# Patient Record
Sex: MALE | Race: WHITE
[De-identification: names, ages, dates, MRNs, and addresses within clinical notes are randomized per-mention and may not be internally consistent; named-entity substitution may affect disease eponyms.]

---

## 2018-06-14 LAB
BASOPHILS # BLD AUTO: 0 10*3/UL (ref 0–0.1)
BASOPHILS NFR BLD AUTO: 0.3 % (ref 0–1)
DEPRECATED NEUTROPHILS # BLD AUTO: 5.9 10*3/UL (ref 2.1–6.9)
EOSINOPHIL # BLD AUTO: 0.2 10*3/UL (ref 0–0.4)
EOSINOPHIL NFR BLD AUTO: 1.9 % (ref 0–6)
ERYTHROCYTE [DISTWIDTH] IN CORD BLOOD: 12.2 % (ref 11.7–14.4)
HCT VFR BLD AUTO: 39.2 % (ref 38.2–49.6)
HGB BLD-MCNC: 13.8 G/DL (ref 14–18)
LYMPHOCYTES # BLD: 2.2 10*3/UL (ref 1–3.2)
LYMPHOCYTES NFR BLD AUTO: 23.4 % (ref 18–39.1)
MCH RBC QN AUTO: 31.4 PG (ref 28–32)
MCHC RBC AUTO-ENTMCNC: 35.2 G/DL (ref 31–35)
MCV RBC AUTO: 89.1 FL (ref 81–99)
MONOCYTES # BLD AUTO: 0.9 10*3/UL (ref 0.2–0.8)
MONOCYTES NFR BLD AUTO: 9.9 % (ref 4.4–11.3)
NEUTS SEG NFR BLD AUTO: 64 % (ref 38.7–80)
PLATELET # BLD AUTO: 253 X10E3/UL (ref 140–360)
RBC # BLD AUTO: 4.4 X10E6/UL (ref 4.3–5.7)

## 2018-06-21 ENCOUNTER — HOSPITAL ENCOUNTER (OUTPATIENT)
Dept: HOSPITAL 88 - OR | Age: 73
Discharge: HOME | End: 2018-06-21
Attending: INTERNAL MEDICINE
Payer: MEDICARE

## 2018-06-21 DIAGNOSIS — K21.9: Primary | ICD-10-CM

## 2018-06-21 DIAGNOSIS — K57.30: ICD-10-CM

## 2018-06-21 DIAGNOSIS — K63.5: ICD-10-CM

## 2018-06-21 DIAGNOSIS — K26.9: ICD-10-CM

## 2018-06-21 DIAGNOSIS — Z01.812: ICD-10-CM

## 2018-06-21 DIAGNOSIS — Z01.810: ICD-10-CM

## 2018-06-21 DIAGNOSIS — K22.10: ICD-10-CM

## 2018-06-21 DIAGNOSIS — N40.0: ICD-10-CM

## 2018-06-21 DIAGNOSIS — K31.7: ICD-10-CM

## 2018-06-21 DIAGNOSIS — K64.8: ICD-10-CM

## 2018-06-21 DIAGNOSIS — K58.9: ICD-10-CM

## 2018-06-21 DIAGNOSIS — R19.7: ICD-10-CM

## 2018-06-21 DIAGNOSIS — I10: ICD-10-CM

## 2018-06-21 DIAGNOSIS — K29.70: ICD-10-CM

## 2018-06-21 DIAGNOSIS — E78.00: ICD-10-CM

## 2018-06-21 PROCEDURE — 88305 TISSUE EXAM BY PATHOLOGIST: CPT

## 2018-06-21 PROCEDURE — 88312 SPECIAL STAINS GROUP 1: CPT

## 2018-06-21 PROCEDURE — 85025 COMPLETE CBC W/AUTO DIFF WBC: CPT

## 2018-06-21 PROCEDURE — 45378 DIAGNOSTIC COLONOSCOPY: CPT

## 2018-06-21 PROCEDURE — 43239 EGD BIOPSY SINGLE/MULTIPLE: CPT

## 2018-06-21 PROCEDURE — 36415 COLL VENOUS BLD VENIPUNCTURE: CPT

## 2018-06-21 PROCEDURE — 45384 COLONOSCOPY W/LESION REMOVAL: CPT

## 2018-06-21 PROCEDURE — 93005 ELECTROCARDIOGRAM TRACING: CPT

## 2018-06-21 NOTE — OPERATIVE REPORT
DATE OF PROCEDURE:  June 21, 2018 



REFERRING PHYSICIAN:   Dr. Bruce García



PROCEDURES PERFORMED

1. Esophagogastroduodenoscopy with biopsies.

2. Colonoscopy with polypectomy.



INDICATIONS FOR ESOPHAGOGASTRODUODENOSCOPY:  Acid reflux.



INDICATIONS FOR COLONOSCOPY:  Colorectal cancer screening.  Lower abdominal 

pain.  



MEDICATION:  Patient was done under MAC.  Please see anesthesiologist's 

note.



PROCEDURE:  With patient in the left lateral decubitus position, flexible 

fiberoptic Olympus gastroscope was introduced into the esophagus under 

direct visualization without any difficulty.  There was some erosions noted 

in the distal esophagus.  The GE junction was nodular and friable and 

biopsies were obtained.  The scope was then advanced with ease into the 

stomach.  Mucosa overlying the antrum and the body revealed some patchy 

erythema and low-grade edema and biopsies were obtained sent to stain for 

H. pylori.  Some hyperplastic appearing polyps were noted in the body of 

the stomach and some were partially excised with cold biopsy forceps.  

Pylorus appeared to be of normal contour and shape, was intubated with ease 

and the scope was advanced all the way to the to the 2nd portion of the 

duodenum.  A minute ulcer was noted in the proximal 2nd portion without 

active bleeding or stigmata of recent hemorrhage.  The scope was then 

withdrawn back into the stomach and retroflexed and mucosa overlying the 

fundus and the cardia appeared to be within normal limits.  The scope was 

then straightened out.  The scope was subsequently withdrawn.  Patient 

tolerated procedure well.



IMPRESSION 

1. Erosive distal esophagitis.

2. Gastroesophageal junction nodular friable biopsied.

3. Gastritis biopsied.  Biopsies sent to stain for H. pylori.

4. Gastric polyps, body, some partially excised with the cold biopsy 

forceps.  

5. Duodenal ulcer.



PLAN:  Follow up histology.  Initiate Protonix 40 mg 1 p.o. q. a.m.  a.c.



Patient was then turned around and after adequate lubrication of the anal 

canal a flexible fiberoptic Olympus colonoscope was inserted into the 

rectum with ease and advanced all the way to the cecum.  Prep overall was 

suboptimal with retained stools in the colon.  The scope was then withdrawn 

slowly, and whatever was visualized the mucosa overlying the cecum, 

ascending colon, transverse colon appeared to be within normal limits.  

Diverticular disease was noted to involve the distal descending and the 

sigmoid colon.  One polyp was hot biopsied from the sigmoid colon.  The 

rectum appeared to be within normal limits.  The scope was then retroflexed 

into the distal rectum and small internal hemorrhoids were noted, none of 

which was actively bleeding.  The scope was then straightened out, it was 

subsequently withdrawn.  Patient tolerated procedure well.



IMPRESSION

1. Suboptimal prep.

2. Diverticulosis.

3. Sigmoid colon polyp hot biopsied.

4. Internal hemorrhoids, none actively bleeding.





PLAN:  Follow up histology.  Initiate BSL 3 one p.o. b.i.d. and Bentyl 10 

mg 1 p.o. t.i.d.  Patient might benefit from a followup colonoscopy in 5 

years.  

  



DD:  06/21/2018 14:41

DT:  06/21/2018 14:54

Job#:  [id number]



cc:BRUCE GARCÍA MD

## 2018-06-28 ENCOUNTER — HOSPITAL ENCOUNTER (OUTPATIENT)
Dept: HOSPITAL 88 - US | Age: 73
End: 2018-06-28
Attending: UROLOGY
Payer: MEDICARE

## 2018-06-28 DIAGNOSIS — N28.1: Primary | ICD-10-CM

## 2018-06-28 PROCEDURE — 76770 US EXAM ABDO BACK WALL COMP: CPT

## 2018-06-28 NOTE — DIAGNOSTIC IMAGING REPORT
PROCEDURE:US RETROPERITONEAL ( KIDNEY ).

COMPARISON:Patients [facility], US, US RETROPERITONEAL ( KIDNEY 

)., 5/05/2017, 9:18.

INDICATIONS:Cyst Of Kidney

TECHNIQUE: Grey-scale and color sonographic images of the bilateral 

kidneys and bladder where obtained in transverse and longitudinal 

planes.

 

FINDINGS:

 

RIGHT KIDNEY: 12.2 cm, cortex 1.8 cm

Cysts: 2.1 x 2.3 x 1.5 cm cystic, anechoic lesion in the lateral mid to 

inferior aspect (previously measured 2.1 x 2.3 x 1.4 cm). 

Solid masses: None

Stones: None

Hydronephrosis: None

Echogenicity: Normal

 

LEFT KIDNEY: 12.1 cm, cortex 2.0 cm

Cysts: None

Solid masses: None

Stones: None

Hydronephrosis: None

Echogenicity: Normal

 

Bladder: Unremarkable. Bilateral ureteral jets are identified.

 

Prostate: 2.3 x 2.0 x 3.1 cm (estimated volume 7.5 mL).

 

CONCLUSION:

1. Stable simple right renal cyst.

2. Normal bilateral renal size, and echogenicity. No hydronephrosis or 

obstruction.

 

 

 

 

Mehul Cox M.D.  

Dictated by:  Mehul Cox M.D. on 6/28/2018 at 16:21     

Electronically approved by:  Mehul Cox M.D. on 6/28/2018 at 

16:21

## 2018-10-03 NOTE — XMS REPORT
Patient Summary Document

 Created on: 10/03/2018



RADHA RICKETTS

External Reference #: [id number]

: 1945

Sex: Male



Demographics







 Address  [address] [place] [address] 

[place]  [address]

 

 Home Phone  [phone] CE[place]

 

 Preferred Language  Unknown

 

 Marital Status  Unknown

 

 Anabaptist Affiliation  Unknown

 

 Race  Unknown

 

 Additional Race(s)  

 

 Ethnic Group  Unknown





Author







 Author  [facility]

 

 Address  Unknown

 

 Phone  Unavailable







Care Team Providers







 Care Team Member Name  Role  Phone

 

 KARLA ROSS  Unavailable

 

 KAREN MEDEIROS  Unavailable  Unavailable

 

 RADHA MCHUGH  Unavailable  Unavailable







Problems

This patient has no known problems.



Allergies, Adverse Reactions, Alerts

This patient has no known allergies or adverse reactions.



Medications

This patient has no known medications.



Results







 Test Description  Test Time  Test Comments  Text Results  Atomic Results  
Result Comments









 US RENAL RETROPERITONEAL COMP  2018 16:21:00         [place] [address]      
Patient Name: RADHA RICKETTS   MR #: [id number]    : 1945 Age/Sex: 72/M  
Acct #: [id number] Req #: [id number]  Adm Physician:     Ordered by: KARLA ROSS MD  Report #: [id number]   Location:   Room/Bed:     ______________________
_____________________________________________________________________________  
  Procedure: [id number] US/US RENAL RETROPERITONEAL COMP  Exam Date: 18   
                         Exam Time: 1504       REPORT STATUS: Signed    
PROCEDURE:   US RETROPERITONEAL ( KIDNEY ).   COMPARISON:   [facility], , US RETROPERITONEAL ( KIDNEY    )., 2017, 9:18.   INDICATIONS: 
  Cyst Of Kidney   TECHNIQUE: Grey-scale and color sonographic images of the 
bilateral    kidneys and bladder where obtained in transverse and longitudinal 
   planes.       FINDINGS:          RIGHT KIDNEY: 12.2 cm, cortex 1.8 cm   Cysts
: 2.1 x 2.3 x 1.5 cm cystic, anechoic lesion in the lateral mid to    inferior 
aspect (previously measured 2.1 x 2.3 x 1.4 cm).    Solid masses: None   Stones
: None   Hydronephrosis: None   Echogenicity: Normal       LEFT KIDNEY: 12.1 cm
, cortex 2.0 cm   Cysts: None   Solid masses: None   Stones: None   
Hydronephrosis: None   Echogenicity: Normal       Bladder: Unremarkable. 
Bilateral ureteral jets are identified.       Prostate: 2.3 x 2.0 x 3.1 cm (
estimated volume 7.5 mL).       CONCLUSION:      1. Stable simple right renal 
cyst.   2. Normal bilateral renal size, and echogenicity. No hydronephrosis or 
   obstruction.                   Geraldine Cox M.D.     Dictated by:  
Geraldine Cox M.D. on 2018 at 16:21        Electronically approved by
:  Geraldine Cox M.D. on 2018 at    16:21                Dictated By: 
GERALDINE COX MD  Electronically Signed By: GERALDINE COX MD on 18 162
  Transcribed By: YAMILA on 18 1621       COPY TO:   KARLA ROSS MD        
   

 

 SPINE CERVICAL AP LAT FLEX EXT            [place] [address]      Patient Name: 
RADHA RICKETTS   MR #: [id number]    : 1945 Age/Sex: 72/M  Acct #: 
[id number] Req #: [id number]  Adm Physician:     Ordered by: KAREN MEDEIROS MD  Report #: [id number]   Location: [facility]  Room/Bed:     _________________________
__________________________________________________________________________    
Procedure: [id number] DX/SPINE CERVICAL AP LAT FLEX EXT  Exam Date: 17    
                        Exam Time: 1030       REPORT STATUS: Signed    PROCEDURE
:   C-SPINE AP AND LAT WITH FLEX AND EXT       COMPARISON:   Cervical spine 
series 2017.       INDICATIONS:   POST OP 6 MONTHS CERVICAL FUSION       
FINDINGS:      The lateral view is visualized from the skull base to C7. 
Postoperative    changes of anterior fusion of C5-C7. Minimal interval bony 
fusion.    Degenerative changes evidenced by anterior osteophytosis. The 
vertebral    bodies are well-aligned. There are no fractures, lytic or blastic 
   lesions. The disc-space heights are well-maintained. The C1/C2-odontoid    
interval is normal. The pre-vertebral soft tissues are normal.        CONCLUSION
:      Postoperative changes.       Dictated by:  George Montes M.D. on 2017 at 12:02        Electronically approved by:  George Montes M.D. on 2017 at 12:02                    Dictated By: GEORGE MONTES MD  Electronically 
Signed By: GEORGE MONTES MD on 17 1202  Transcribed By: YAMILA on 17 
1202       COPY TO:   KAREN MEDEIROS MD           

 

 MRI SPINE LUMBAR WO            [place] [address]      Patient Name: RADHA RICKETTS
   MR #: [id number]    : 1945 Age/Sex: 72/M  Acct #: [id number] Req #
: [id number]  Adm Physician:     Ordered by: RADHA MCHUGH MD  Report #: [id number]
[id number]   Location: MRI  Room/Bed:     ____________________________________________
_______________________________________________________    Procedure: [id number] 
MRI/MRI SPINE LUMBAR WO  Exam Date: 10/11/17                            Exam 
Time: 08       REPORT STATUS: Signed    History: Bilateral leg pain, left 
greater than right   Comparison studies: Lumbar spine MRI on 2016      
Technique:    Sagittal, coronal and axial T2 , sagittal T1 and IR, axial  spin 
density   oblique.   Intravenous contrast: None      Findings:      Number of 
lumbar vertebral bodies:5      Alignment:    2 mm retrolisthesis of L5 on S1.   
Normal lordosis.No scoliosis.      Soft tissues: No T2 hyperintense 
inflammatory changes.   Paraspinal muscles: No signal abnormalities.  No 
atrophy.   Lower thoracic cord:Normal in signal and morphology.  The tip of the 
conus is   at  T12-L1.   Cauda equina: No masses. No arachnoiditis.      
Vertebrae:   Normal in height and signal intensity.   No compression fractures, 
infection or neoplasm.      Degenerative changes:      L1-L2:   No 
abnormalities.      L2-L3:   No abnormalities.      L3-L4:   Mild spinal canal 
and is moderate left foraminal stenosis due to an asymmetric   disc bulge.   No 
significant right foraminal stenosis. No disc herniation.      L4-L5:   Mildly 
degenerated disc (mostly decreased T2 signal).   Severe spinal canal stenosis 
due to a disc bulge, thickened ligamenta flava and   facet arthrosis.   No 
significant foraminal stenosis. No disc herniation.      L5-S1:   Mildly 
degenerated disc (mostly decreased T2 signal within posterior annular   
fissure.   Foraminal stenosis, moderate left, mild right due to an asymmetric 
disc bulge.   No significant spinal canal stenosis. No disc radiation         
Partially visualized sacrum:   No signal abnormalities.      Incidental 2.7 cm 
T2 hyperintense focus in the left kidney most likely an   incidental renal 
cyst.      IMPRESSION:      No changes when compared to the lumbar spine MRI on 
2016.      Persistent findings:   1.  Mildly degenerated disks at L4-5 and 
L5-S1.   2.  Severe spinal canal stenosis at L4-5, mild at L3-4.   3.  
Degenerative foraminal stenosis, moderate left at L3-4 and L5-S1.   4.  No disc 
herniations      Signed by: Dr. Rod Jacobs M.D. on 10/11/2017 5:38 PM 
       Dictated By: ROD JACOBS MD, MD  Electronically Signed By: ROD JACOBS MD, MD on 10/11/17 173  Transcribed By: JAGDEEP on 10/11/17 1738
       COPY TO:   RADHA MCHUGH MD

## 2018-11-30 ENCOUNTER — HOSPITAL ENCOUNTER (INPATIENT)
Dept: HOSPITAL 88 - ER | Age: 73
LOS: 3 days | Discharge: HOME | DRG: 418 | End: 2018-12-03
Attending: INTERNAL MEDICINE | Admitting: INTERNAL MEDICINE
Payer: MEDICARE

## 2018-11-30 VITALS — DIASTOLIC BLOOD PRESSURE: 72 MMHG | SYSTOLIC BLOOD PRESSURE: 129 MMHG

## 2018-11-30 VITALS — HEIGHT: 69 IN | WEIGHT: 195 LBS | BODY MASS INDEX: 28.88 KG/M2

## 2018-11-30 DIAGNOSIS — E78.5: ICD-10-CM

## 2018-11-30 DIAGNOSIS — N17.9: ICD-10-CM

## 2018-11-30 DIAGNOSIS — K80.00: Primary | ICD-10-CM

## 2018-11-30 DIAGNOSIS — K21.9: ICD-10-CM

## 2018-11-30 DIAGNOSIS — K82.1: ICD-10-CM

## 2018-11-30 DIAGNOSIS — K82.A1: ICD-10-CM

## 2018-11-30 DIAGNOSIS — E11.9: ICD-10-CM

## 2018-11-30 DIAGNOSIS — Z79.84: ICD-10-CM

## 2018-11-30 LAB
ALBUMIN SERPL-MCNC: 3.2 G/DL (ref 3.5–5)
ALBUMIN/GLOB SERPL: 0.7 {RATIO} (ref 0.8–2)
ALP SERPL-CCNC: 53 IU/L (ref 40–150)
ALT SERPL-CCNC: 26 IU/L (ref 0–55)
AMYLASE SERPL-CCNC: 39 U/L (ref 25–125)
ANION GAP SERPL CALC-SCNC: 15.5 MMOL/L (ref 8–16)
BACTERIA URNS QL MICRO: (no result) /HPF
BASOPHILS # BLD AUTO: 0.1 10*3/UL (ref 0–0.1)
BASOPHILS NFR BLD AUTO: 0.3 % (ref 0–1)
BILIRUB UR QL: NEGATIVE
BUN SERPL-MCNC: 33 MG/DL (ref 7–26)
BUN/CREAT SERPL: 17 (ref 6–25)
CALCIUM SERPL-MCNC: 10.2 MG/DL (ref 8.4–10.2)
CHLORIDE SERPL-SCNC: 95 MMOL/L (ref 98–107)
CK MB SERPL-MCNC: 0.7 NG/ML (ref 0–5)
CK SERPL-CCNC: 48 IU/L (ref 30–200)
CLARITY UR: CLEAR
CO2 SERPL-SCNC: 25 MMOL/L (ref 22–29)
COLOR UR: YELLOW
DEPRECATED APTT PLAS QN: 34.3 SECONDS (ref 23.8–35.5)
DEPRECATED INR PLAS: 1.12
DEPRECATED NEUTROPHILS # BLD AUTO: 11.7 10*3/UL (ref 2.1–6.9)
DEPRECATED RBC URNS MANUAL-ACNC: (no result) /HPF (ref 0–5)
EGFRCR SERPLBLD CKD-EPI 2021: 33 ML/MIN (ref 60–?)
EOSINOPHIL # BLD AUTO: 0.1 10*3/UL (ref 0–0.4)
EOSINOPHIL NFR BLD AUTO: 0.5 % (ref 0–6)
EPI CELLS URNS QL MICRO: (no result) /LPF
ERYTHROCYTE [DISTWIDTH] IN CORD BLOOD: 12 % (ref 11.7–14.4)
GLOBULIN PLAS-MCNC: 4.7 G/DL (ref 2.3–3.5)
GLUCOSE SERPLBLD-MCNC: 183 MG/DL (ref 74–118)
HCT VFR BLD AUTO: 42.5 % (ref 38.2–49.6)
HGB BLD-MCNC: 14.6 G/DL (ref 14–18)
HYALINE CASTS #/AREA URNS LPF: (no result) /[LPF] (ref 0–1)
KETONES UR QL STRIP.AUTO: NEGATIVE
LEUKOCYTE ESTERASE UR QL STRIP.AUTO: NEGATIVE
LIPASE SERPL-CCNC: 20 U/L (ref 8–78)
LYMPHOCYTES # BLD: 2.4 10*3/UL (ref 1–3.2)
LYMPHOCYTES NFR BLD AUTO: 15.2 % (ref 18–39.1)
MCH RBC QN AUTO: 30 PG (ref 28–32)
MCHC RBC AUTO-ENTMCNC: 34.4 G/DL (ref 31–35)
MCV RBC AUTO: 87.3 FL (ref 81–99)
MONOCYTES # BLD AUTO: 1.3 10*3/UL (ref 0.2–0.8)
MONOCYTES NFR BLD AUTO: 8.2 % (ref 4.4–11.3)
NEUTS SEG NFR BLD AUTO: 75.3 % (ref 38.7–80)
NITRITE UR QL STRIP.AUTO: NEGATIVE
PLATELET # BLD AUTO: 322 X10E3/UL (ref 140–360)
POTASSIUM SERPL-SCNC: 3.5 MMOL/L (ref 3.5–5.1)
PROT UR QL STRIP.AUTO: NEGATIVE
PROTHROMBIN TIME: 15.4 SECONDS (ref 11.9–14.5)
RBC # BLD AUTO: 4.87 X10E6/UL (ref 4.3–5.7)
SODIUM SERPL-SCNC: 132 MMOL/L (ref 136–145)
SP GR UR STRIP: 1 (ref 1.01–1.02)
UROBILINOGEN UR STRIP-MCNC: 0.2 MG/DL (ref 0.2–1)
WBC #/AREA URNS HPF: (no result) /HPF (ref 0–5)

## 2018-11-30 PROCEDURE — 76705 ECHO EXAM OF ABDOMEN: CPT

## 2018-11-30 PROCEDURE — 74177 CT ABD & PELVIS W/CONTRAST: CPT

## 2018-11-30 PROCEDURE — 99284 EMERGENCY DEPT VISIT MOD MDM: CPT

## 2018-11-30 PROCEDURE — 83690 ASSAY OF LIPASE: CPT

## 2018-11-30 PROCEDURE — 85610 PROTHROMBIN TIME: CPT

## 2018-11-30 PROCEDURE — 81001 URINALYSIS AUTO W/SCOPE: CPT

## 2018-11-30 PROCEDURE — 88304 TISSUE EXAM BY PATHOLOGIST: CPT

## 2018-11-30 PROCEDURE — 71045 X-RAY EXAM CHEST 1 VIEW: CPT

## 2018-11-30 PROCEDURE — 82550 ASSAY OF CK (CPK): CPT

## 2018-11-30 PROCEDURE — 93005 ELECTROCARDIOGRAM TRACING: CPT

## 2018-11-30 PROCEDURE — 36415 COLL VENOUS BLD VENIPUNCTURE: CPT

## 2018-11-30 PROCEDURE — 80053 COMPREHEN METABOLIC PANEL: CPT

## 2018-11-30 PROCEDURE — 85025 COMPLETE CBC W/AUTO DIFF WBC: CPT

## 2018-11-30 PROCEDURE — 85730 THROMBOPLASTIN TIME PARTIAL: CPT

## 2018-11-30 PROCEDURE — 84484 ASSAY OF TROPONIN QUANT: CPT

## 2018-11-30 PROCEDURE — 82150 ASSAY OF AMYLASE: CPT

## 2018-11-30 PROCEDURE — 82948 REAGENT STRIP/BLOOD GLUCOSE: CPT

## 2018-11-30 PROCEDURE — 82553 CREATINE MB FRACTION: CPT

## 2018-11-30 RX ADMIN — SODIUM CHLORIDE SCH GM: 9 INJECTION, SOLUTION INTRAVENOUS at 15:20

## 2018-11-30 RX ADMIN — INSULIN LISPRO SCH UNIT: 100 INJECTION, SOLUTION INTRAVENOUS; SUBCUTANEOUS at 18:36

## 2018-11-30 RX ADMIN — SODIUM CHLORIDE SCH MLS/HR: 9 INJECTION, SOLUTION INTRAVENOUS at 15:20

## 2018-11-30 RX ADMIN — INSULIN LISPRO SCH UNIT: 100 INJECTION, SOLUTION INTRAVENOUS; SUBCUTANEOUS at 21:00

## 2018-11-30 NOTE — XMS REPORT
Continuity of Care Document

                             Created on: 2018



BRUCE JAIMES

External Reference #: [id number]

: 1945

Sex: Male



Demographics







                          Address                   [address] [place]  [address]

 

                          Home Phone                [phone]

 

                          Preferred Language        English

 

                          Marital Status            

 

                          Hoahaoism Affiliation     Nondenominational (non-Orthodoxy, non-specific)

 

                          Race                      White

 

                          Ethnic Group              Non-





Author







                          Author                    [facility]

 

                          Organization              [facility]

 

                          Address                   Unknown

 

                          Phone                     Unavailable







Support







                Name            Relationship    Address         Phone

 

                    TONYA VELASQUEZ MD    Caregiver           [address]

[address]

[place], [place]  [id number] [phone]







Insurance Providers







                          Guarantor                 Bruce Jaimes

 

                          Address                   [address] [place] [address]

 

                          Phone                     [phone]

 

                          Email                     N











                          Payer                     [employer]

 

                          Policy Number             [id number]

 

                          Subscriber's Name         Bruce Jaimes

 

                          Relationship              Self / Same As Patient

 

                          Group Number              [id number]

 

                          Group Name                [facility]

 

                          Effective Date            17











                          Payer                     Medicare

 

                          Policy Number             [id number]

 

                          Subscriber's Name         Bruce Jaimes

 

                          Relationship              Self / Same As Patient

 

                          Group Number              [id number]

 

                          Group Name                MEDICARE

 

                          Effective Date            07/01/10







Advance Directives







                    Directive           Response            Recorded Date/Time

 

                    Does the Patient have an Advance Directive?    No                  18 6:39pm







Chief Complaint and Reason for Visit







                          Chief Complaint           Chest Pain

 

                          Reason for Visit          Hyperglycemia

Atypical chest pain







Problems





Active ProblemsNo active problem information available.







Past Problems





                    Medical Problem     Onset Date          Status

 

                    Hyperglycemia       Unknown             Acute

 

                    Atypical chest pain    Unknown             Acute







Medications





Current Home Medications





        Medication    Dose    Units    Route    Directions    Days    Qty     Instructions    Start Date

 

          Metformin Hcl (Glucophage) 500 Mg Tab    500       Mg        Oral      Twice A Day    15 Days    30 Tablet

                                                    18







Social History







             Social History Problem    Response     Recorded Date/Time    Onset Date    Status

 

             Hx Tobacco Use    No           2018 8:03pm    Not Applicable    Not Applicable







Hospital Discharge Instructions

No hospital discharge instruction information available.



Plan of Care







                          Discharge Date            18 11:55pm

 

                          Disposition               HOME, SELF-CARE 01

 

                          Condition at Discharge    Stable

 

                          Instructions/Education Provided    Chest Pain That Is Not Caused by the Heart (DC)



Hyperglycemia, Adult (DC)

 

                          Prescriptions             See Medication Section







Functional Status

No functional status information available.



Allergies, Adverse Reactions, Alerts

No known allergies.



Immunizations







                    Query               Response on File    Recorded Date/Time

 

                    HX of Pneumococcal Vaccine    Yes                 18 6:17pm

 

                    HX of Influenza Vaccine    Yes                 18 6:17pm







Vital Signs





Acute Vital Signs





                    Vital               Response            Date/Time

 

                    Temperature (Fahrenheit)    98.1 degrees F (97.6 - 99.5)    2018 11:54pm

 

                    Pulse Rate (adult)    87 bpm (60 - 100)    2018 11:53pm

 

                    Pulse Rate          87 bpm              2018 11:54pm

 

                    Respiratory Rate    20 breaths per minute (12 - 24)    2018 11:53pm

 

                    Respiratory Rate    20 breaths per minute    2018 11:54pm

 

                    Blood Pressure Systolic    173 mm Hg (100 - 140)    2018 11:53pm

 

                    Blood Pressure Systolic    173 mm Hg           2018 11:54pm

 

                    Blood Pressure Diastolic    76 mm Hg (60 - 90)    2018 11:53pm

 

                    Blood Pressure Diastolic    76 mm Hg            2018 11:54pm

 

                    Height              5 ft 10 in          2018 6:15pm

 

                    Weight              199 lb              2018 6:15pm

 

                    Body Mass Index     28.6 kg/m^2         2018 6:15pm







Results





Laboratory Results





          Test Name    Result    Units     Flags     Reference    Collection Date/Time    Result Date/Time

                                        Comments

 

          White Blood Count    8.0       10*3/uL              4.5-11.5    2018 7:35pm    2018 7:48pm

                                         

 

          Red Blood Count    4.87      10*6/uL              4.4-6.2    2018 7:35pm    2018 7:48pm

                                         

 

        Hemoglobin    14.6    g/dL            13.0-17.5    2018 7:35pm    2018 7:48pm     

 

        Hematocrit    43.7    %               39.0-52.5    2018 7:35pm    2018 7:48pm     

 

          Mean Corpuscular Volume    90        fL                  80-94     2018 7:35pm    2018 7:48pm 

                                         

 

          Mean Corpuscular Hemoglobin    30.0      pg                  27.0-33.0    2018 7:35pm    2018

 7:48pm                                  

 

           Mean Corpuscular Hemoglobin Concent    33.4       g/dL                  33.0-37.0    2018 7:35pm

                          2018 7:48pm          

 

          Red Cell Distribution Width    12.1      %                   10.7-14.5    2018 7:35pm    2018

 7:48pm                                  

 

        Platelet Count    263     10*3/uL            150-450    2018 7:35pm    2018 7:48pm    

 

 

          Mean Platelet Volume    9.9       fl                  5.7-10.7    2018 7:35pm    2018 7:48pm

                                         

 

        Neutrophils (%) (Auto)    69      %               47-75    2018 7:35pm    2018 7:48pm     



 

          Immature Granulocyte % (Auto)    0         %                   0-0       2018 7:35pm    2018 7:48pm

                                         

 

        Lymphocytes (%) (Auto)    21      %        L      25-44    2018 7:35pm    2018 7:48pm    

 

 

        Monocytes (%) (Auto)    9       %               3-10    2018 7:35pm    2018 7:48pm     

 

        Eosinophils (%) (Auto)    2       %               0-7     2018 7:35pm    2018 7:48pm     

 

        Basophils (%) (Auto)    1       %               0-1     2018 7:35pm    2018 7:48pm     

 

          Nucleated Red Blood Cells %    0.0       %                   0-0.2     2018 7:35pm    2018 7:48pm

                                         

 

          Neutrophils # (Auto)    5.5       10*3/uL              1.3-6.7    2018 7:35pm    2018 7:48pm

                                         

 

          Immature Granulocyte # (Auto)    0.0       10*3/uL              0.0-0.0    2018 7:35pm    2018

 7:48pm                                  

 

          Lymphocytes # (Auto)    1.7       10*3/uL              1.4-4.1    2018 7:35pm    2018 7:48pm

                                         

 

          Monocytes # (Auto)    0.7       10*3/uL              0-1.3     2018 7:35pm    2018 7:48pm

                                         

 

          Eosinophils # (Auto)    0.1       10*3/uL              0-0.8     2018 7:35pm    2018 7:48pm

                                         

 

          Basophils # (Auto)    0.0       10*3/uL              0-0.1     2018 7:35pm    2018 7:48pm

                                         

 

          Nucleated Red Blood Cells #    0.00      10*3/uL              0-0.01    2018 7:35pm    2018

 7:48pm                                  

 

        Manual Differential    Not Ind                            2018 7:35pm    2018 7:48pm     

 

        Sodium Level    135     mmol/L     L      136-145    2018 7:35pm    2018 8:07pm     

 

        Potassium Level    4.2     mmol/L            3.5-5.1    2018 7:35pm    2018 8:07pm    

 

 

        Chloride Level    97      mmol/L     L          2018 7:35pm    2018 8:07pm     

 

          Carbon Dioxide Level    24        mmol/L              24-33     2018 7:35pm    2018 8:07pm

                                         

 

        Anion Gap    18                      8-18    2018 7:35pm    2018 8:07pm     

 

        Blood Urea Nitrogen    21      mg/dL            82018 7:35pm    2018 8:07pm     



 

        Creatinine    1.3     mg/dL            0.9-1.5    2018 7:35pm    2018 8:07pm     

 

          Estimat Glomerular Filtration Rate    58                                2018 7:35pm    2018

 8:07pm 

      Stages of Patients with           Estimated GFR

       Known Kidney Disease         (ml/min/1.73 sq.meters)



Stage 1 - Kidney damage w/normal      90 mL/min or greater

           or increased GFR

Stage 2 - Kidney disease w/mildly     60-89 mL/min

           decreased GFR

Stage 3 - Moderately decreased GFR    30-59 mL/min

Stage 4 - Severely decreased GFR      15-29 mL/min

Stage 5 - Kidney failure              14 mL/min or less



To estimate the GFR for  Americans, multiply the 

result provided by 1.21.

 

        Glucose Level    382     mg/dL     H          2018 7:35pm    2018 8:07pm     

 

        Calcium Level    9.6     mg/dL            9.1-10.9    2018 7:35pm    2018 8:07pm     

 

        Total Bilirubin    0.4     mg/dL            0.0-1.0    2018 7:35pm    2018 8:07pm     



 

          Aspartate Amino Transf (AST/SGOT)    21        U/L                 0-40      2018 7:35pm    2018

 8:07pm                                  

 

          Alanine Aminotransferase (ALT/SGPT)    28        U/L                 0-41      2018 7:35pm    2018

 8:07pm                                  

 

        Total Protein    7.6     g/dL            6.4-8.3    2018 7:35pm    2018 8:07pm     

 

        Albumin    4.4     g/dL            3.5-5.0    2018 7:35pm    2018 8:07pm     

 

        Alkaline Phosphatase    54      U/L                 2018 7:35pm    2018 8:07pm    

 

 

        Lipase    62      U/L      H      13-60    2018 7:35pm    2018 8:07pm     

 

          Helicobacter pylori Antibodies    Negative                        Negative    2018 7:35pm    2018

 8:58pm 

H.pylori serological testing cannot distinguish between 

active and past infections. Alternative noninvasive testing 

recommended for adults includes the stool antigen test. 

Order Helicobacter pylori Antigen - Fecal.

 

          Bedside Troponin I    0.00      ng/mL               0.00-0.07    2018 9:13pm    2018 9:24pm

 



*NOTE: ED bedside Cardiac Marker results may not correlate 

directly with results from Clinical Lab due to differences 

in methodologies.

 

          Bedside Glucose    309       mg/dL      H            2018 11:22pm    2018 11:24pm 

                                         







Procedures







                Procedure       Status          Date            Provider(s)

 

                ECG (electrocardiogram)    Active          18        KADEN MALIN MD

 

                X-ray of chest, single view    Completed       18        KADEN MALIN MD







Encounters







             Encounter    Location     Arrival/Admit Date    Discharge/Depart Date    Attending Provider



 

                Departed Emergency Room    RODERICK Teague    18 6:06pm    18 11:55pm

                                        TONYA VELASQUEZ MD











                                        Recent Diagnosis

 

                                        Hyperglycemia

## 2018-11-30 NOTE — DIAGNOSTIC IMAGING REPORT
EXAMINATION:  CHEST SINGLE (NOT PORTABLE)    



INDICATION:      Abdomen pain.  





COMPARISON:  None

     

FINDINGS:

TUBES and LINES:  None.



LUNGS:  Lungs are well inflated.  Likely minimal scarring/atelectasis at the

left lung base.   There is no evidence of pneumonia or pulmonary edema.



PLEURA:  No pleural effusion or pneumothorax.



HEART AND MEDIASTINUM:  The cardiomediastinal silhouette is unremarkable.    



BONES AND SOFT TISSUES:  No acute osseous lesion.  Soft tissues are

unremarkable. Surgical hardware is seen over the lower cervical spine.



UPPER ABDOMEN: No free air under the diaphragm.    



IMPRESSION: 

Likely minimal scarring/atelectasis at the left lung base.  





Signed by: Dr. Augie Bacon M.D. on 11/30/2018 12:22 PM

## 2018-11-30 NOTE — DIAGNOSTIC IMAGING REPORT
EXAMINATION: CT of the abdomen and pelvis with contrast.



TECHNIQUE: 

Spiral CT images of the abdomen and pelvis were performed from the lung bases

to the lesser trochanters after the intravenous administration of 100 cc

Isovue-370 and the oral administration of water. Coronal and sagittal

reformatted images were obtained.



COMPARISON: Right upper quadrant ultrasound same day



CLINICAL HISTORY:Right-sided abdominal pain

     

DISCUSSION:



ABDOMEN/PELVIS:



LOWER THORAX:Scattered linear opacities in the lung bases compatible with

subsegmental atelectasis. 5 mm groundglass nodule left lower lobe series 2

image 8.



HEPATOBILIARY: Hepatic parenchyma is diffusely hypoattenuating compatible with

steatosis. No focal hepatic lesion.  No intrahepatic biliary ductal dilatation.

A 9 mm radiopaque calculus is lodged in the gallbladder neck as seen on series

2 image 30. There is resultant gallbladder wall thickening and enhancement,

with pericholecystic inflammatory fat stranding. No radiopaque distal biliary

calculi are appreciated.      



SPLEEN: No splenomegaly.



PANCREAS: No focal masses or ductal dilatation. 



ADRENALS: No adrenal nodules.



KIDNEYS/URETERS: 2.4 cm simple cyst projects medially from the right kidney. No

hydronephrosis or calculi.



PELVIC ORGANS/BLADDER: Bilateral bladder diverticula. Urinary bladder is

otherwise unremarkable. Surgical clips along the prostate, which measures 4.3

cm in diameter, with coarse central calcifications.



PERITONEUM/RETROPERITONEUM: No ascites. No pneumoperitoneum.



LYMPH NODES: No pelvic sidewall, retroperitoneal, or mesenteric

lymphadenopathy. Enhancing probably reactive subcentimeter lymph node in the

eva hepatis.



VESSELS: Abdominal aorta, major branch vessels, and iliac arterial systems are

well-visualized and patent with minimal atherosclerotic calcification.

Circumaortic left renal vein. Portal vein, splenic vein, and central superior

mesenteric vein are patent.



GI TRACT: Scattered diverticula along the sigmoid colon without wall thickening

or adjacent inflammatory change. Normal appendix. Stomach is partially

collapsed with prominent rugal folds. No small bowel dilatation to suggest

obstruction.



BONES AND SOFT TISSUE: No osseous destructive lesions. Multilevel degenerative

disc changes and facet arthropathy of the lumbar spine. L4 laminectomy defect. 

No focal soft tissue abnormalities.



IMPRESSION: 



Radiopaque calculus in the gallbladder neck, not visualized on comparison

ultrasound, results in acute cholecystitis.



Hepatic steatosis with regional sparing along the gallbladder fossa.



Atherosclerotic vascular disease.



5 mm groundglass nodule in the left lower lobe is indeterminate and should be

assessed for stability by CT scan of the chest without contrast in one year.



Signed by: Dr. Justice Zee M.D. on 11/30/2018 1:56 PM

## 2018-11-30 NOTE — DIAGNOSTIC IMAGING REPORT
EXAM: Right Upper Quadrant Ultrasound

INDICATION:        

^ABD PAIN

^[id number]

^1058

^Y

COMPARISON: None. 

TECHNIQUE: Transverse and longitudinal images of the right upper abdomen were

obtained. 



FINDINGS:     

Liver:

Size: 11.6 cm in the right midclavicular line, normal

Appearance: Normal echogenicity, smooth contour

Mass: No focal masses



Gallbladder:

Stones/Sludge: None

Wall: 0.4 cm

Appearance: No wall thickening, pericholecystic fluid or hydrops.  

Sonographic Capps's Sign: Negative



Bile Ducts:

Intrahepatic Ducts: No dilatation

Extrahepatic Ducts: Common bile duct measures 0.9 cm, prominent



Pancreas:

Visualized portions of the pancreatic head, neck and proximal body are normal.



Kidneys:

Length: 

Right 11.7 cm             

Echogenicity:  Normal

Collecting System:  No hydronephrosis

Stone:  None

Cyst/Mass: 1.5 x 1.9 x 1.6 cm nonvascular anechoic simple cyst in the right

kidney with mild perinephric fluid.



Vessels:

Aorta: Visualized portions are normal

Inferior Vena Cava: Visualized portions are normal

Main Portal Vein: 1.1 cm, normal size with hepatopetal flow.



Free Fluid:

No ascites or pleural effusion



IMPRESSION:

Simple right renal cyst, measuring up to 1.9 cm.



No gallstones or pericholecystic fluid.



Nonspecific mildly prominence of the common bile duct, measuring up to 0.9 cm,

without visualized stones.



Signed by: Dr. Merary Barry M.D. on 11/30/2018 12:15 PM

## 2018-12-01 VITALS — SYSTOLIC BLOOD PRESSURE: 136 MMHG | DIASTOLIC BLOOD PRESSURE: 67 MMHG

## 2018-12-01 VITALS — SYSTOLIC BLOOD PRESSURE: 143 MMHG | DIASTOLIC BLOOD PRESSURE: 76 MMHG

## 2018-12-01 VITALS — DIASTOLIC BLOOD PRESSURE: 54 MMHG | SYSTOLIC BLOOD PRESSURE: 110 MMHG

## 2018-12-01 VITALS — DIASTOLIC BLOOD PRESSURE: 76 MMHG | SYSTOLIC BLOOD PRESSURE: 143 MMHG

## 2018-12-01 VITALS — SYSTOLIC BLOOD PRESSURE: 129 MMHG | DIASTOLIC BLOOD PRESSURE: 65 MMHG

## 2018-12-01 VITALS — DIASTOLIC BLOOD PRESSURE: 67 MMHG | SYSTOLIC BLOOD PRESSURE: 136 MMHG

## 2018-12-01 LAB
ALBUMIN SERPL-MCNC: 2.6 G/DL (ref 3.5–5)
ALBUMIN/GLOB SERPL: 0.6 {RATIO} (ref 0.8–2)
ALP SERPL-CCNC: 82 IU/L (ref 40–150)
ALT SERPL-CCNC: 67 IU/L (ref 0–55)
AMYLASE SERPL-CCNC: 37 U/L (ref 25–125)
ANION GAP SERPL CALC-SCNC: 13.6 MMOL/L (ref 8–16)
BASOPHILS # BLD AUTO: 0 10*3/UL (ref 0–0.1)
BASOPHILS NFR BLD AUTO: 0.4 % (ref 0–1)
BUN SERPL-MCNC: 23 MG/DL (ref 7–26)
BUN/CREAT SERPL: 18 (ref 6–25)
CALCIUM SERPL-MCNC: 9.3 MG/DL (ref 8.4–10.2)
CHLORIDE SERPL-SCNC: 103 MMOL/L (ref 98–107)
CO2 SERPL-SCNC: 25 MMOL/L (ref 22–29)
DEPRECATED NEUTROPHILS # BLD AUTO: 5 10*3/UL (ref 2.1–6.9)
EGFRCR SERPLBLD CKD-EPI 2021: 54 ML/MIN (ref 60–?)
EOSINOPHIL # BLD AUTO: 0.2 10*3/UL (ref 0–0.4)
EOSINOPHIL NFR BLD AUTO: 2.2 % (ref 0–6)
ERYTHROCYTE [DISTWIDTH] IN CORD BLOOD: 12.1 % (ref 11.7–14.4)
GLOBULIN PLAS-MCNC: 4.1 G/DL (ref 2.3–3.5)
GLUCOSE SERPLBLD-MCNC: 137 MG/DL (ref 74–118)
HCT VFR BLD AUTO: 37.4 % (ref 38.2–49.6)
HGB BLD-MCNC: 12.8 G/DL (ref 14–18)
LIPASE SERPL-CCNC: 17 U/L (ref 8–78)
LYMPHOCYTES # BLD: 1.2 10*3/UL (ref 1–3.2)
LYMPHOCYTES NFR BLD AUTO: 16.9 % (ref 18–39.1)
MCH RBC QN AUTO: 30.5 PG (ref 28–32)
MCHC RBC AUTO-ENTMCNC: 34.2 G/DL (ref 31–35)
MCV RBC AUTO: 89.3 FL (ref 81–99)
MONOCYTES # BLD AUTO: 0.7 10*3/UL (ref 0.2–0.8)
MONOCYTES NFR BLD AUTO: 9.9 % (ref 4.4–11.3)
NEUTS SEG NFR BLD AUTO: 70 % (ref 38.7–80)
PLATELET # BLD AUTO: 278 X10E3/UL (ref 140–360)
POTASSIUM SERPL-SCNC: 4.6 MMOL/L (ref 3.5–5.1)
RBC # BLD AUTO: 4.19 X10E6/UL (ref 4.3–5.7)
SODIUM SERPL-SCNC: 137 MMOL/L (ref 136–145)

## 2018-12-01 PROCEDURE — 0FT44ZZ RESECTION OF GALLBLADDER, PERCUTANEOUS ENDOSCOPIC APPROACH: ICD-10-PCS | Performed by: SURGERY

## 2018-12-01 RX ADMIN — INSULIN LISPRO SCH UNIT: 100 INJECTION, SOLUTION INTRAVENOUS; SUBCUTANEOUS at 07:30

## 2018-12-01 RX ADMIN — SODIUM CHLORIDE SCH MLS/HR: 9 INJECTION, SOLUTION INTRAVENOUS at 02:20

## 2018-12-01 RX ADMIN — SODIUM CHLORIDE SCH MLS/HR: 9 INJECTION, SOLUTION INTRAVENOUS at 08:37

## 2018-12-01 RX ADMIN — INSULIN LISPRO SCH UNIT: 100 INJECTION, SOLUTION INTRAVENOUS; SUBCUTANEOUS at 11:30

## 2018-12-01 RX ADMIN — INSULIN LISPRO SCH UNIT: 100 INJECTION, SOLUTION INTRAVENOUS; SUBCUTANEOUS at 21:28

## 2018-12-01 RX ADMIN — SODIUM CHLORIDE SCH MLS/HR: 9 INJECTION, SOLUTION INTRAVENOUS at 23:50

## 2018-12-01 RX ADMIN — SODIUM CHLORIDE SCH MG: 900 INJECTION INTRAVENOUS at 17:39

## 2018-12-01 RX ADMIN — SODIUM CHLORIDE SCH MLS/HR: 9 INJECTION, SOLUTION INTRAVENOUS at 14:22

## 2018-12-01 RX ADMIN — ACETAMINOPHEN PRN MG: 10 INJECTION, SOLUTION INTRAVENOUS at 23:50

## 2018-12-01 RX ADMIN — INSULIN LISPRO SCH UNIT: 100 INJECTION, SOLUTION INTRAVENOUS; SUBCUTANEOUS at 16:30

## 2018-12-01 RX ADMIN — TAZOBACTAM SODIUM AND PIPERACILLIN SODIUM SCH MLS/HR: 375; 3 INJECTION, SOLUTION INTRAVENOUS at 18:26

## 2018-12-01 RX ADMIN — SODIUM CHLORIDE SCH GM: 9 INJECTION, SOLUTION INTRAVENOUS at 13:30

## 2018-12-01 RX ADMIN — METRONIDAZOLE SCH MLS/HR: 500 INJECTION, SOLUTION INTRAVENOUS at 17:40

## 2018-12-02 VITALS — DIASTOLIC BLOOD PRESSURE: 90 MMHG | SYSTOLIC BLOOD PRESSURE: 187 MMHG

## 2018-12-02 VITALS — DIASTOLIC BLOOD PRESSURE: 71 MMHG | SYSTOLIC BLOOD PRESSURE: 127 MMHG

## 2018-12-02 VITALS — SYSTOLIC BLOOD PRESSURE: 180 MMHG | DIASTOLIC BLOOD PRESSURE: 92 MMHG

## 2018-12-02 VITALS — DIASTOLIC BLOOD PRESSURE: 79 MMHG | SYSTOLIC BLOOD PRESSURE: 162 MMHG

## 2018-12-02 VITALS — SYSTOLIC BLOOD PRESSURE: 187 MMHG | DIASTOLIC BLOOD PRESSURE: 90 MMHG

## 2018-12-02 VITALS — SYSTOLIC BLOOD PRESSURE: 152 MMHG | DIASTOLIC BLOOD PRESSURE: 76 MMHG

## 2018-12-02 LAB
ALBUMIN SERPL-MCNC: 2.5 G/DL (ref 3.5–5)
ALBUMIN SERPL-MCNC: 2.5 G/DL (ref 3.5–5)
ALBUMIN/GLOB SERPL: 0.6 {RATIO} (ref 0.8–2)
ALBUMIN/GLOB SERPL: 0.6 {RATIO} (ref 0.8–2)
ALP SERPL-CCNC: 104 IU/L (ref 40–150)
ALP SERPL-CCNC: 92 IU/L (ref 40–150)
ALT SERPL-CCNC: 102 IU/L (ref 0–55)
ALT SERPL-CCNC: 82 IU/L (ref 0–55)
AMYLASE SERPL-CCNC: 26 U/L (ref 25–125)
ANION GAP SERPL CALC-SCNC: 13.1 MMOL/L (ref 8–16)
ANION GAP SERPL CALC-SCNC: 14.7 MMOL/L (ref 8–16)
BASOPHILS # BLD AUTO: 0 10*3/UL (ref 0–0.1)
BASOPHILS # BLD AUTO: 0 10*3/UL (ref 0–0.1)
BASOPHILS NFR BLD AUTO: 0.2 % (ref 0–1)
BASOPHILS NFR BLD AUTO: 0.4 % (ref 0–1)
BUN SERPL-MCNC: 13 MG/DL (ref 7–26)
BUN SERPL-MCNC: 18 MG/DL (ref 7–26)
BUN/CREAT SERPL: 12 (ref 6–25)
BUN/CREAT SERPL: 14 (ref 6–25)
CALCIUM SERPL-MCNC: 9 MG/DL (ref 8.4–10.2)
CALCIUM SERPL-MCNC: 9 MG/DL (ref 8.4–10.2)
CHLORIDE SERPL-SCNC: 105 MMOL/L (ref 98–107)
CHLORIDE SERPL-SCNC: 106 MMOL/L (ref 98–107)
CO2 SERPL-SCNC: 23 MMOL/L (ref 22–29)
CO2 SERPL-SCNC: 24 MMOL/L (ref 22–29)
DEPRECATED NEUTROPHILS # BLD AUTO: 5.7 10*3/UL (ref 2.1–6.9)
DEPRECATED NEUTROPHILS # BLD AUTO: 7 10*3/UL (ref 2.1–6.9)
EGFRCR SERPLBLD CKD-EPI 2021: 55 ML/MIN (ref 60–?)
EGFRCR SERPLBLD CKD-EPI 2021: > 60 ML/MIN (ref 60–?)
EOSINOPHIL # BLD AUTO: 0 10*3/UL (ref 0–0.4)
EOSINOPHIL # BLD AUTO: 0.1 10*3/UL (ref 0–0.4)
EOSINOPHIL NFR BLD AUTO: 0 % (ref 0–6)
EOSINOPHIL NFR BLD AUTO: 0.7 % (ref 0–6)
ERYTHROCYTE [DISTWIDTH] IN CORD BLOOD: 12.3 % (ref 11.7–14.4)
ERYTHROCYTE [DISTWIDTH] IN CORD BLOOD: 12.4 % (ref 11.7–14.4)
GLOBULIN PLAS-MCNC: 4.1 G/DL (ref 2.3–3.5)
GLOBULIN PLAS-MCNC: 4.3 G/DL (ref 2.3–3.5)
GLUCOSE SERPLBLD-MCNC: 178 MG/DL (ref 74–118)
GLUCOSE SERPLBLD-MCNC: 206 MG/DL (ref 74–118)
HCT VFR BLD AUTO: 37.7 % (ref 38.2–49.6)
HCT VFR BLD AUTO: 38.7 % (ref 38.2–49.6)
HGB BLD-MCNC: 12.7 G/DL (ref 14–18)
HGB BLD-MCNC: 12.8 G/DL (ref 14–18)
LIPASE SERPL-CCNC: 6 U/L (ref 8–78)
LYMPHOCYTES # BLD: 0.9 10*3/UL (ref 1–3.2)
LYMPHOCYTES # BLD: 1.3 10*3/UL (ref 1–3.2)
LYMPHOCYTES NFR BLD AUTO: 10.2 % (ref 18–39.1)
LYMPHOCYTES NFR BLD AUTO: 16 % (ref 18–39.1)
MCH RBC QN AUTO: 29.8 PG (ref 28–32)
MCH RBC QN AUTO: 30.2 PG (ref 28–32)
MCHC RBC AUTO-ENTMCNC: 33.1 G/DL (ref 31–35)
MCHC RBC AUTO-ENTMCNC: 33.7 G/DL (ref 31–35)
MCV RBC AUTO: 89.8 FL (ref 81–99)
MCV RBC AUTO: 90 FL (ref 81–99)
MONOCYTES # BLD AUTO: 0.9 10*3/UL (ref 0.2–0.8)
MONOCYTES # BLD AUTO: 0.9 10*3/UL (ref 0.2–0.8)
MONOCYTES NFR BLD AUTO: 11.6 % (ref 4.4–11.3)
MONOCYTES NFR BLD AUTO: 9.8 % (ref 4.4–11.3)
NEUTS SEG NFR BLD AUTO: 70.6 % (ref 38.7–80)
NEUTS SEG NFR BLD AUTO: 79.1 % (ref 38.7–80)
PLATELET # BLD AUTO: 297 X10E3/UL (ref 140–360)
PLATELET # BLD AUTO: 324 X10E3/UL (ref 140–360)
POTASSIUM SERPL-SCNC: 4.1 MMOL/L (ref 3.5–5.1)
POTASSIUM SERPL-SCNC: 4.7 MMOL/L (ref 3.5–5.1)
RBC # BLD AUTO: 4.2 X10E6/UL (ref 4.3–5.7)
RBC # BLD AUTO: 4.3 X10E6/UL (ref 4.3–5.7)
SODIUM SERPL-SCNC: 138 MMOL/L (ref 136–145)
SODIUM SERPL-SCNC: 139 MMOL/L (ref 136–145)

## 2018-12-02 RX ADMIN — SODIUM CHLORIDE SCH MLS/HR: 9 INJECTION, SOLUTION INTRAVENOUS at 12:38

## 2018-12-02 RX ADMIN — INSULIN LISPRO SCH UNIT: 100 INJECTION, SOLUTION INTRAVENOUS; SUBCUTANEOUS at 21:00

## 2018-12-02 RX ADMIN — METRONIDAZOLE SCH MLS/HR: 500 INJECTION, SOLUTION INTRAVENOUS at 18:29

## 2018-12-02 RX ADMIN — TAZOBACTAM SODIUM AND PIPERACILLIN SODIUM SCH MLS/HR: 375; 3 INJECTION, SOLUTION INTRAVENOUS at 06:40

## 2018-12-02 RX ADMIN — METRONIDAZOLE SCH MLS/HR: 500 INJECTION, SOLUTION INTRAVENOUS at 12:38

## 2018-12-02 RX ADMIN — TAZOBACTAM SODIUM AND PIPERACILLIN SODIUM SCH MLS/HR: 375; 3 INJECTION, SOLUTION INTRAVENOUS at 00:55

## 2018-12-02 RX ADMIN — SODIUM CHLORIDE SCH MG: 900 INJECTION INTRAVENOUS at 16:47

## 2018-12-02 RX ADMIN — METRONIDAZOLE SCH MLS/HR: 500 INJECTION, SOLUTION INTRAVENOUS at 00:05

## 2018-12-02 RX ADMIN — AMLODIPINE BESYLATE SCH MG: 10 TABLET ORAL at 08:32

## 2018-12-02 RX ADMIN — INSULIN LISPRO SCH UNIT: 100 INJECTION, SOLUTION INTRAVENOUS; SUBCUTANEOUS at 16:30

## 2018-12-02 RX ADMIN — ACETAMINOPHEN PRN MG: 10 INJECTION, SOLUTION INTRAVENOUS at 12:36

## 2018-12-02 RX ADMIN — INSULIN LISPRO SCH UNIT: 100 INJECTION, SOLUTION INTRAVENOUS; SUBCUTANEOUS at 11:30

## 2018-12-02 RX ADMIN — BENAZEPRIL HYDROCHLORIDE SCH MG: 10 TABLET, COATED ORAL at 08:32

## 2018-12-02 RX ADMIN — METRONIDAZOLE SCH MLS/HR: 500 INJECTION, SOLUTION INTRAVENOUS at 05:32

## 2018-12-02 RX ADMIN — TAZOBACTAM SODIUM AND PIPERACILLIN SODIUM SCH MLS/HR: 375; 3 INJECTION, SOLUTION INTRAVENOUS at 17:07

## 2018-12-02 RX ADMIN — INSULIN LISPRO SCH UNIT: 100 INJECTION, SOLUTION INTRAVENOUS; SUBCUTANEOUS at 07:30

## 2018-12-02 RX ADMIN — TAZOBACTAM SODIUM AND PIPERACILLIN SODIUM SCH MLS/HR: 375; 3 INJECTION, SOLUTION INTRAVENOUS at 12:38

## 2018-12-02 NOTE — OPERATIVE REPORT
DATE OF PROCEDURE:  December 01, 2018 



PREOPERATIVE DIAGNOSES:  Acute cholecystitis and cholelithiasis with 

gallbladder hydrops.



POSTOPERATIVE DIAGNOSES:  Acute gangrenous cholecystitis and cholelithiasis 

with gallbladder hydrops.



OPERATION PERFORMED:  Laparoscopic cholecystectomy.



ANESTHESIA:  General.



COMPLICATIONS:  None.



ESTIMATED BLOOD LOSS:  50 mL.



DESCRIPTION OF PROCEDURE:  With the patient lying in bed in the supine 

position under good general endotracheal anesthesia, the abdomen was 

prepped Betadine solution and draped in the usual manner.  A Veress needle 

was introduced into the umbilicus and pneumoperitoneum was established 

without any difficulty.  An 11-mm trocar was placed into the umbilicus and 

a 10-mm video laparoscope was placed into the intraabdominal cavity.  Under 

direct vision, three 5-mm trocars were placed in the right subcostal region 

and an extra 5-mm trocar was placed in the left upper abdomen.  Video 

laparoscopy at this point revealed the gallbladder that was totally covered 

up by the omentum.  There was some purulent material overlying the liver.  

Once we managed to mobilize the omentum off of the gallbladder, the 

gallbladder was acutely inflamed with gangrenous changes.  It was acutely 

distended with the stone impacted at the neck of the gallbladder.  The rest 

of the abdominal exploration appeared to be within normal limits other than 

some fatty infiltration of the liver.  All of the adhesions to the 

gallbladder were then slowly and carefully taken down.  Once this was done, 

the peritoneum overlying the neck of the gallbladder was then opened.  

There was a lot of inflammatory reaction and edema in the area.  

Nonetheless, the cystic duct was identified and followed to its junction 

with the common duct. The cystic duct was then circumferentially dissected 

away from the common duct, doubly clipped and divided.  The cystic artery 

had an anterior and a posterior branch and both of these were individually 

clipped and divided.  Gallbladder was then slowly and carefully taken off 

the liver bed.  There was a lot of inflammatory reaction on the posterior 

wall of the gallbladder and the liver bed with some gangrenous changes of 

the gallbladder wall.  Nonetheless, the gallbladder was then slowly and 

carefully taken off of the liver bed and perfect hemostasis was 

ascertained.  The gallbladder was then placed in a pouch and removed 

through the umbilicus after enlarging the umbilical incision.  Video 

laparoscopy was then again carried out.  The liver bed was found to be 

perfectly dry.  The area was thoroughly irrigated and all of the excess 

fluid was aspirated and hemostasis was ascertained.  Because of all 

inflammatory changes, a 10 flat Cristino-Ball was left in the hepatorenal 

fossa and brought out through the lateral most port on the right side and 

sutured to the skin with 2-0 silk.  The pneumoperitoneum was then evacuated 

and all the trocars were removed under direct vision.  The midline fascia 

at the umbilicus was then closed with figure-of-eight of 0 Vicryl.  All 

layers were infiltrated on the way out with solution of 0.25% Marcaine.  

Subcutaneous tissue was approximated with 3-0 Vicryl and the skin was 

closed with subcuticular 5-0 Vicryl.  Benzoin, Steri-Strips, and Band-Aids 

were applied.  The sponge, lap and needle count was correct.  The patient 

tolerated the procedure well and returned to the recovery room in stable 

condition.  

  







DD:  12/01/2018 16:15

DT:  12/01/2018 23:07

Job#:  [id number] VAS

## 2018-12-03 VITALS — DIASTOLIC BLOOD PRESSURE: 72 MMHG | SYSTOLIC BLOOD PRESSURE: 153 MMHG

## 2018-12-03 VITALS — SYSTOLIC BLOOD PRESSURE: 159 MMHG | DIASTOLIC BLOOD PRESSURE: 84 MMHG

## 2018-12-03 VITALS — DIASTOLIC BLOOD PRESSURE: 94 MMHG | SYSTOLIC BLOOD PRESSURE: 185 MMHG

## 2018-12-03 VITALS — DIASTOLIC BLOOD PRESSURE: 86 MMHG | SYSTOLIC BLOOD PRESSURE: 166 MMHG

## 2018-12-03 VITALS — SYSTOLIC BLOOD PRESSURE: 149 MMHG | DIASTOLIC BLOOD PRESSURE: 83 MMHG

## 2018-12-03 VITALS — DIASTOLIC BLOOD PRESSURE: 84 MMHG | SYSTOLIC BLOOD PRESSURE: 159 MMHG

## 2018-12-03 LAB
ALBUMIN SERPL-MCNC: 2.4 G/DL (ref 3.5–5)
ALBUMIN/GLOB SERPL: 0.6 {RATIO} (ref 0.8–2)
ALP SERPL-CCNC: 82 IU/L (ref 40–150)
ALT SERPL-CCNC: 65 IU/L (ref 0–55)
ANION GAP SERPL CALC-SCNC: 13.6 MMOL/L (ref 8–16)
BASOPHILS # BLD AUTO: 0 10*3/UL (ref 0–0.1)
BASOPHILS NFR BLD AUTO: 0.4 % (ref 0–1)
BUN SERPL-MCNC: 11 MG/DL (ref 7–26)
BUN/CREAT SERPL: 11 (ref 6–25)
CALCIUM SERPL-MCNC: 8.7 MG/DL (ref 8.4–10.2)
CHLORIDE SERPL-SCNC: 107 MMOL/L (ref 98–107)
CO2 SERPL-SCNC: 20 MMOL/L (ref 22–29)
DEPRECATED NEUTROPHILS # BLD AUTO: 5.4 10*3/UL (ref 2.1–6.9)
EGFRCR SERPLBLD CKD-EPI 2021: > 60 ML/MIN (ref 60–?)
EOSINOPHIL # BLD AUTO: 0.1 10*3/UL (ref 0–0.4)
EOSINOPHIL NFR BLD AUTO: 1.5 % (ref 0–6)
ERYTHROCYTE [DISTWIDTH] IN CORD BLOOD: 12.3 % (ref 11.7–14.4)
GLOBULIN PLAS-MCNC: 4 G/DL (ref 2.3–3.5)
GLUCOSE SERPLBLD-MCNC: 154 MG/DL (ref 74–118)
HCT VFR BLD AUTO: 36.4 % (ref 38.2–49.6)
HGB BLD-MCNC: 11.8 G/DL (ref 14–18)
LYMPHOCYTES # BLD: 1.5 10*3/UL (ref 1–3.2)
LYMPHOCYTES NFR BLD AUTO: 18.7 % (ref 18–39.1)
MCH RBC QN AUTO: 29.6 PG (ref 28–32)
MCHC RBC AUTO-ENTMCNC: 32.4 G/DL (ref 31–35)
MCV RBC AUTO: 91.5 FL (ref 81–99)
MONOCYTES # BLD AUTO: 0.8 10*3/UL (ref 0.2–0.8)
MONOCYTES NFR BLD AUTO: 10.2 % (ref 4.4–11.3)
NEUTS SEG NFR BLD AUTO: 68.6 % (ref 38.7–80)
PLATELET # BLD AUTO: 296 X10E3/UL (ref 140–360)
POTASSIUM SERPL-SCNC: 3.6 MMOL/L (ref 3.5–5.1)
RBC # BLD AUTO: 3.98 X10E6/UL (ref 4.3–5.7)
SODIUM SERPL-SCNC: 137 MMOL/L (ref 136–145)

## 2018-12-03 RX ADMIN — INSULIN LISPRO SCH UNIT: 100 INJECTION, SOLUTION INTRAVENOUS; SUBCUTANEOUS at 11:30

## 2018-12-03 RX ADMIN — TAZOBACTAM SODIUM AND PIPERACILLIN SODIUM SCH MLS/HR: 375; 3 INJECTION, SOLUTION INTRAVENOUS at 06:46

## 2018-12-03 RX ADMIN — SODIUM CHLORIDE SCH MG: 900 INJECTION INTRAVENOUS at 17:50

## 2018-12-03 RX ADMIN — INSULIN LISPRO SCH UNIT: 100 INJECTION, SOLUTION INTRAVENOUS; SUBCUTANEOUS at 07:30

## 2018-12-03 RX ADMIN — TAZOBACTAM SODIUM AND PIPERACILLIN SODIUM SCH MLS/HR: 375; 3 INJECTION, SOLUTION INTRAVENOUS at 17:50

## 2018-12-03 RX ADMIN — METRONIDAZOLE SCH MLS/HR: 500 INJECTION, SOLUTION INTRAVENOUS at 13:00

## 2018-12-03 RX ADMIN — METRONIDAZOLE SCH MLS/HR: 500 INJECTION, SOLUTION INTRAVENOUS at 18:00

## 2018-12-03 RX ADMIN — BENAZEPRIL HYDROCHLORIDE SCH MG: 10 TABLET, COATED ORAL at 09:14

## 2018-12-03 RX ADMIN — SODIUM CHLORIDE SCH MLS/HR: 9 INJECTION, SOLUTION INTRAVENOUS at 01:14

## 2018-12-03 RX ADMIN — METRONIDAZOLE SCH MLS/HR: 500 INJECTION, SOLUTION INTRAVENOUS at 05:28

## 2018-12-03 RX ADMIN — AMLODIPINE BESYLATE SCH MG: 10 TABLET ORAL at 09:14

## 2018-12-03 RX ADMIN — INSULIN LISPRO SCH UNIT: 100 INJECTION, SOLUTION INTRAVENOUS; SUBCUTANEOUS at 16:30

## 2018-12-03 RX ADMIN — SODIUM CHLORIDE SCH MLS/HR: 9 INJECTION, SOLUTION INTRAVENOUS at 12:06

## 2018-12-03 RX ADMIN — TAZOBACTAM SODIUM AND PIPERACILLIN SODIUM SCH MLS/HR: 375; 3 INJECTION, SOLUTION INTRAVENOUS at 01:00

## 2018-12-03 RX ADMIN — TAZOBACTAM SODIUM AND PIPERACILLIN SODIUM SCH MLS/HR: 375; 3 INJECTION, SOLUTION INTRAVENOUS at 12:06

## 2018-12-03 RX ADMIN — METRONIDAZOLE SCH MLS/HR: 500 INJECTION, SOLUTION INTRAVENOUS at 00:00

## 2019-07-12 LAB
ANION GAP SERPL CALC-SCNC: 12.7 MMOL/L (ref 8–16)
BASOPHILS # BLD AUTO: 0.1 10*3/UL (ref 0–0.1)
BASOPHILS NFR BLD AUTO: 0.8 % (ref 0–1)
BUN SERPL-MCNC: 18 MG/DL (ref 7–26)
BUN/CREAT SERPL: 13 (ref 6–25)
CALCIUM SERPL-MCNC: 9.9 MG/DL (ref 8.4–10.2)
CHLORIDE SERPL-SCNC: 104 MMOL/L (ref 98–107)
CO2 SERPL-SCNC: 27 MMOL/L (ref 22–29)
DEPRECATED NEUTROPHILS # BLD AUTO: 3.4 10*3/UL (ref 2.1–6.9)
EGFRCR SERPLBLD CKD-EPI 2021: 50 ML/MIN (ref 60–?)
EOSINOPHIL # BLD AUTO: 0.2 10*3/UL (ref 0–0.4)
EOSINOPHIL NFR BLD AUTO: 3.5 % (ref 0–6)
ERYTHROCYTE [DISTWIDTH] IN CORD BLOOD: 12.3 % (ref 11.7–14.4)
GLUCOSE SERPLBLD-MCNC: 171 MG/DL (ref 74–118)
HCT VFR BLD AUTO: 41.3 % (ref 38.2–49.6)
HGB BLD-MCNC: 14.1 G/DL (ref 14–18)
LYMPHOCYTES # BLD: 2 10*3/UL (ref 1–3.2)
LYMPHOCYTES NFR BLD AUTO: 31.3 % (ref 18–39.1)
MCH RBC QN AUTO: 30.8 PG (ref 28–32)
MCHC RBC AUTO-ENTMCNC: 34.1 G/DL (ref 31–35)
MCV RBC AUTO: 90.2 FL (ref 81–99)
MONOCYTES # BLD AUTO: 0.7 10*3/UL (ref 0.2–0.8)
MONOCYTES NFR BLD AUTO: 10.9 % (ref 4.4–11.3)
NEUTS SEG NFR BLD AUTO: 53 % (ref 38.7–80)
PLATELET # BLD AUTO: 265 X10E3/UL (ref 140–360)
POTASSIUM SERPL-SCNC: 4.7 MMOL/L (ref 3.5–5.1)
RBC # BLD AUTO: 4.58 X10E6/UL (ref 4.3–5.7)
SODIUM SERPL-SCNC: 139 MMOL/L (ref 136–145)

## 2019-07-12 NOTE — DIAGNOSTIC IMAGING REPORT
EXAMINATION:  CHEST 2 VIEWS    



INDICATION: Pre-operative.



COMPARISON:  Chest radiograph 11/30/2018.

     

FINDINGS:

TUBES and LINES:  None.



LUNGS:  Lungs are well inflated.   There is no evidence of lobar pneumonia or

pulmonary edema. Mild patchy left basilar opacity, likely atelectasis. Left

lower lobe pulmonary nodule noted on CT from 11/30/2018 is not well

characterized by radiograph.



PLEURA:  No pleural effusion or pneumothorax. 



HEART AND MEDIASTINUM:  The cardiomediastinal silhouette is unremarkable.    



BONES AND SOFT TISSUES:  No acute osseous abnormality. Partially seen cervical

spine fixation hardware.



UPPER ABDOMEN: No free air under the diaphragm.    



IMPRESSION: 

No acute radiographic abnormality.



Left lower lobe pulmonary nodule noted on CT from 11/30/2018 is not well

characterized by radiograph. Please refer to the prior report for further

details.



Signed by: Dr. Anthony Domingo MD on 7/12/2019 11:23 AM

## 2019-07-17 ENCOUNTER — HOSPITAL ENCOUNTER (OUTPATIENT)
Dept: HOSPITAL 88 - OR | Age: 74
Setting detail: OBSERVATION
LOS: 1 days | Discharge: HOME | End: 2019-07-18
Attending: SURGERY | Admitting: SURGERY
Payer: MEDICARE

## 2019-07-17 VITALS — SYSTOLIC BLOOD PRESSURE: 134 MMHG | DIASTOLIC BLOOD PRESSURE: 77 MMHG

## 2019-07-17 VITALS — DIASTOLIC BLOOD PRESSURE: 91 MMHG | SYSTOLIC BLOOD PRESSURE: 168 MMHG

## 2019-07-17 VITALS — DIASTOLIC BLOOD PRESSURE: 94 MMHG | SYSTOLIC BLOOD PRESSURE: 156 MMHG

## 2019-07-17 VITALS — WEIGHT: 198.03 LBS | BODY MASS INDEX: 28.35 KG/M2 | HEIGHT: 70 IN

## 2019-07-17 DIAGNOSIS — E11.9: ICD-10-CM

## 2019-07-17 DIAGNOSIS — I10: ICD-10-CM

## 2019-07-17 DIAGNOSIS — K64.5: Primary | ICD-10-CM

## 2019-07-17 PROCEDURE — 36415 COLL VENOUS BLD VENIPUNCTURE: CPT

## 2019-07-17 PROCEDURE — 85025 COMPLETE CBC W/AUTO DIFF WBC: CPT

## 2019-07-17 PROCEDURE — 80048 BASIC METABOLIC PNL TOTAL CA: CPT

## 2019-07-17 PROCEDURE — 71046 X-RAY EXAM CHEST 2 VIEWS: CPT

## 2019-07-17 PROCEDURE — 88304 TISSUE EXAM BY PATHOLOGIST: CPT

## 2019-07-17 PROCEDURE — 82948 REAGENT STRIP/BLOOD GLUCOSE: CPT

## 2019-07-17 PROCEDURE — 93005 ELECTROCARDIOGRAM TRACING: CPT

## 2019-07-17 PROCEDURE — 46260 REMOVE IN/EX HEM GROUPS 2+: CPT

## 2019-07-17 RX ADMIN — SODIUM CHLORIDE SCH MLS/HR: 9 INJECTION, SOLUTION INTRAVENOUS at 20:54

## 2019-07-17 RX ADMIN — METFORMIN HYDROCHLORIDE SCH MG: 500 TABLET, FILM COATED ORAL at 17:13

## 2019-07-17 RX ADMIN — SODIUM CHLORIDE SCH MLS/HR: 9 INJECTION, SOLUTION INTRAVENOUS at 12:30

## 2019-07-17 RX ADMIN — TRIAMCINOLONE ACETONIDE PRN GM: 1 CREAM TOPICAL at 20:54

## 2019-07-17 RX ADMIN — CEFOXITIN SODIUM SCH MLS/HR: 1 INJECTION, SOLUTION INTRAVENOUS at 12:30

## 2019-07-17 RX ADMIN — CEFOXITIN SODIUM SCH MLS/HR: 1 INJECTION, SOLUTION INTRAVENOUS at 17:14

## 2019-07-17 RX ADMIN — TRIAMCINOLONE ACETONIDE PRN GM: 1 CREAM TOPICAL at 14:50

## 2019-07-17 NOTE — NUR
RECEIVED PT TO FLOOR [id number]. 

PT DENIES PAIN AT THIS TIME 

LAYING ON HIS SIDE WITH PACKING TO HIS BOTTOM DRY AND INTACT

WITH MESH PANTIES FOR COMFORT

PT HAS A RIGHT HAND 20 NS AT 100CC.HR SITE IS CLEAN AND DRY

WILL CONTINUE TO MONITOR PT CLOSELY

SIDE RAILSX2, BED WHEELS LOCKED, CALL LIGHT IS WITHIN EASY REACH

INSTRUCTED TO CALL FOR ASSISTANCE IF NEEDED

## 2019-07-17 NOTE — OPERATIVE REPORT
DATE OF PROCEDURE:  07/17/2019

 

SURGEON:  Jordan Milner MD

 

PREOPERATIVE DIAGNOSIS:  Thrombosed prolapsing internal and external hemorrhoids.

 

POSTOPERATIVE DIAGNOSIS:  Thrombosed prolapsing internal and external hemorrhoids.

 

OPERATION PERFORMED:  Internal and external hemorrhoidectomy.

 

ASSISTANT:  GABBIE Benedict

 

ANESTHESIA:  General.

 

COMPLICATIONS:  None.

 

ESTIMATED BLOOD LOSS:  Minimal.

 

DESCRIPTION OF PROCEDURE:  With the patient lying in bed in the lithotomy position.  The

perineum was prepped with Betadine solution and draped in the usual manner.  A complete

anorectal block was then performed with 0.25% Marcaine and 1% Xylocaine mixed in equal

parts.  The examination at this point revealed a large hemorrhoidal group at the 8

o'clock position and another large group at the 12 o'clock position and a small group at

the 4 o'clock position.  Once at the four o'clock and 12 o'clock positions were done in

similar fashion.  The base of the hemorrhoid was then ligated with a 0-chromic suture.

The external component was then resected along with the prolapsing component of the

hemorrhoids.  The base of the hemorrhoid was then oversewn with the same 0-chromic

suture.  Hemostasis was ascertained and the skin was then reapproximated with

interrupted sutures of 3-0 chromic.  At the 4 o'clock position was simply ligated with a

0-chromic suture.  Hemostasis was ascertained.  Gelfoam pack impregnated with Xylocaine

was placed.  Dressing was applied.  The sponge, lap, and needle count was correct.  The

patient tolerated the procedure well and returned to the recovery room in stable

condition. 

 

 

 

 

______________________________

Jordan Milner MD

 

JLR/MODL

D:  07/17/2019 10:30:20

T:  07/17/2019 12:18:26

Job #:  [id number]

## 2019-07-18 VITALS — SYSTOLIC BLOOD PRESSURE: 146 MMHG | DIASTOLIC BLOOD PRESSURE: 79 MMHG

## 2019-07-18 VITALS — SYSTOLIC BLOOD PRESSURE: 156 MMHG | DIASTOLIC BLOOD PRESSURE: 87 MMHG

## 2019-07-18 VITALS — SYSTOLIC BLOOD PRESSURE: 139 MMHG | DIASTOLIC BLOOD PRESSURE: 76 MMHG

## 2019-07-18 VITALS — SYSTOLIC BLOOD PRESSURE: 134 MMHG | DIASTOLIC BLOOD PRESSURE: 62 MMHG

## 2019-07-18 RX ADMIN — TRIAMCINOLONE ACETONIDE PRN GM: 1 CREAM TOPICAL at 14:39

## 2019-07-18 RX ADMIN — METFORMIN HYDROCHLORIDE SCH MG: 500 TABLET, FILM COATED ORAL at 09:46

## 2019-07-18 RX ADMIN — SODIUM CHLORIDE SCH MLS/HR: 9 INJECTION, SOLUTION INTRAVENOUS at 06:21

## 2019-07-18 RX ADMIN — TRIAMCINOLONE ACETONIDE PRN GM: 1 CREAM TOPICAL at 03:15

## 2020-04-23 ENCOUNTER — HOSPITAL ENCOUNTER (OUTPATIENT)
Dept: HOSPITAL 88 - US | Age: 75
End: 2020-04-23
Attending: UROLOGY
Payer: MEDICARE

## 2020-04-23 DIAGNOSIS — N28.1: Primary | ICD-10-CM

## 2020-04-23 PROCEDURE — 76770 US EXAM ABDO BACK WALL COMP: CPT

## 2020-04-23 NOTE — DIAGNOSTIC IMAGING REPORT
EXAM: Renal Ultrasound

INDICATION:        

^[id number]

^1143

^CYST OF KIDNEY  

COMPARISON: None 

TECHNIQUE: Transverse and longitudinal images of the kidneys and bladder were

obtained.  



FINDINGS:     



Right Kidney: 

Length: 12.2 cm

Appearance: Normal echogenicity.  

Collecting system: No hydronephrosis

Stones: None

Cyst/Mass: Lower pole 2.8 x 2.6 x 2.0 cm anechoic simple cyst.



Left Kidney: 

Length: 11.5 cm

Appearance: Normal echogenicity.  

Collecting system: No hydronephrosis

Stones: None

Cyst/Mass: None



Bladder: 

No mass or calculi. Bilateral ureteral jets visualized. Prevoid volume estimate

of 63 cc.



IMPRESSION:

No hydronephrosis or renal calculi.



Right lower pole simple cyst.



Signed by: Shanika Roger MD on 4/23/2020 1:30 PM